# Patient Record
Sex: FEMALE | Race: WHITE | NOT HISPANIC OR LATINO | URBAN - METROPOLITAN AREA
[De-identification: names, ages, dates, MRNs, and addresses within clinical notes are randomized per-mention and may not be internally consistent; named-entity substitution may affect disease eponyms.]

---

## 2023-08-29 ENCOUNTER — OFFICE VISIT (OUTPATIENT)
Dept: URBAN - METROPOLITAN AREA CLINIC 86 | Facility: CLINIC | Age: 58
End: 2023-08-29
Payer: COMMERCIAL

## 2023-08-29 VITALS
BODY MASS INDEX: 20.24 KG/M2 | WEIGHT: 110 LBS | SYSTOLIC BLOOD PRESSURE: 135 MMHG | HEIGHT: 62 IN | DIASTOLIC BLOOD PRESSURE: 79 MMHG | HEART RATE: 58 BPM | TEMPERATURE: 97.3 F

## 2023-08-29 DIAGNOSIS — Z79.899 HIGH RISK MEDICATIONS (NOT ANTICOAGULANTS) LONG-TERM USE: ICD-10-CM

## 2023-08-29 DIAGNOSIS — K76.0 FATTY LIVER: ICD-10-CM

## 2023-08-29 DIAGNOSIS — R74.8 ABNORMAL LIVER ENZYMES: ICD-10-CM

## 2023-08-29 DIAGNOSIS — M19.90 ARTHRITIS: ICD-10-CM

## 2023-08-29 DIAGNOSIS — G43.909 MIGRAINE: ICD-10-CM

## 2023-08-29 DIAGNOSIS — F41.9 ANXIETY AND DEPRESSION: ICD-10-CM

## 2023-08-29 DIAGNOSIS — Z71.89 VACCINE COUNSELING: ICD-10-CM

## 2023-08-29 PROCEDURE — 99205 OFFICE O/P NEW HI 60 MIN: CPT

## 2023-08-29 PROCEDURE — 99245 OFF/OP CONSLTJ NEW/EST HI 55: CPT

## 2023-08-29 RX ORDER — SUMATRIPTAN SUCCINATE 25 MG/1
1 TABLET AT LEAST 2 HOURS BETWEEN DOSES AS NEEDED TABLET, FILM COATED ORAL TWICE A DAY
Status: ACTIVE | COMMUNITY

## 2023-08-29 RX ORDER — ATOGEPANT 30 MG/1
1 TABLET TABLET ORAL ONCE A DAY
Status: ON HOLD | COMMUNITY

## 2023-08-29 NOTE — HPI-TODAY'S VISIT:
Patient is a 57-year-old female being referred in by Tessy LEYVA from the [facility] for evaluation of liver issues.  A copy of the note will be sent to the referring provider.  2 faxes were sent and of information and they were reviewed for the visit.  2023 labs showed serum iodine was low at 32.1 in July and normal at 40.4 in May 30 of this year. She curiously is using sea salt and not supplement iodized salt and she is now on iodine supplement.  Alpha one  level was normal at 113.  Phenotype was not  done.  Pt says father and grandfather and brother  52 of liver disease and hx of alcohol and mother has hx of liver disease alcohol use also.  Pt said she had some social alcohol as youth but not now.  Albumin was 4.3, alk phos 67 AST was 33 ALT 44 elevated with total bili 0.4 direct 0.11 and total protein 6.5.    Hemoglobin 13.1 hematocrit 40.8.  MCV elevated at 103 neutrophils were 54%/2.6.  Platelets 281.  TSH 1.38.  2023 labs showed alk phos 60 AST 38 ALT 38.  Bilirubin 0.4   labs showed AST 29 ALT 23 alk phos 68.  Bilirubin 0.7 cholesterol 179 triglycerides 63 HDL 74 LDL 93 B12 835 vitamin D level 33.1 magnesium 2.2.  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  No livertox re it and in a healthline report it states: In studies of Qulipta, people with increased liver enzymes did not report liver problem symptoms. So your doctor may check your blood levels during your treatment.  No herbals and no green tea. No turmeric and no collagen peptides. No mvi.  No asheagandha or farrukh.  March 3, 2021 labs showed AST 27 ALT 22 alk phos 70.  Bilirubin 0.4.  She says only change was qulipta and the iodine.  Second fax from [facility] sent in on this patient which was 2 pages was an ultrasound from 2023 from [facility] showing the liver to be mildly increased in echotexture, no hepatic masses, main portal vein hepatic veins were patent with appropriate directional flow.  Bile ducts were normal.  Common bile duct was 4 mm.  Gallbladder was normal.  No gallstones.  Pancreas appeared normal.  Pancreatic tail was obscured by gas.  Right kidney 10.2 cm and left kidney 10.2 cm.  Parapelvic cyst seen right kidney measuring 18 x 20 x 9 mm.  No hydronephrosis seen.  Spleen normal size.  No ascites.  The overall felt that the liver had mildly increased echotexture in keeping with fatty replacement but no mass.  Third fax sent and also [facility] at 3 pages mentions that the patient was being referred in by Tessy Mckeon for Abdo liver labs, and the fatty liver finding.  We will try to get the records sent in the missing other medicine information because the fact that the labs are normal before and arising certainly speaks to possible that the medication as a cause.  Generally increasing weight could also do this as well. Plan  1.  She answered the question of other meds and none seen. Off qulipta and that was newest med.. 2.  Needs full screens rule out other liver disease as well as check for hep A/B immunity. Check alpja phenotype. 3.  If it is fatty liver then the focus should be for now  healthy diet and some daily exercise.  4.  Consider fib 4 index.  Duration of the visit was 70 minutes with 40 minutes of chart prep loading the faxes and info to chart and then 30 minutes of face to face visit reviewing recent records, discussing their current status and the future plans for the patient.

## 2023-08-29 NOTE — EXAM-PHYSICAL EXAM
Gen: awake and responsive. Eyes: anicteric, normal lids. Mouth: normal lips. Nose: no drainage Hearing: intact grossly. Neck: trachea midline and no jvd. CV: RRR no s3. Lungs: clear. No wheezes, Abd: Soft, nabs, nr, NT. No hsm. Ext: no sig edema, some palm erythema. Neuro: moves all 4 ext grossly. No asterixis. Skin: no pruritis and some palm erythema.

## 2023-09-08 ENCOUNTER — TELEPHONE ENCOUNTER (OUTPATIENT)
Dept: URBAN - METROPOLITAN AREA CLINIC 86 | Facility: CLINIC | Age: 58
End: 2023-09-08

## 2023-09-08 LAB
A/G RATIO: 1.7
ABSOLUTE BASOPHILS: 72
ABSOLUTE EOSINOPHILS: 254
ABSOLUTE LYMPHOCYTES: 1200
ABSOLUTE MONOCYTES: 456
ABSOLUTE NEUTROPHILS: 2818
ALBUMIN: 4.1
ALKALINE PHOSPHATASE: 51
ALPHA-1-ANTITRYPSIN (AAT) PHENOTYPE: (no result)
ALT (SGPT): 23
ANACHOICE(R) SCREEN: NEGATIVE
AST (SGOT): 28
BASOPHILS: 1.5
BILIRUBIN, TOTAL: 0.5
BUN/CREATININE RATIO: (no result)
BUN: 19
CALCIUM: 9.3
CARBON DIOXIDE, TOTAL: 25
CERULOPLASMIN: 28
CHLORIDE: 106
CREATININE: 0.77
EGFR: 90
EOSINOPHILS: 5.3
FERRITIN, SERUM: 41
GLOBULIN, TOTAL: 2.4
GLUCOSE: 89
HEMATOCRIT: 36.2
HEMOGLOBIN: 12.5
HEPATITIS A AB, TOTAL: (no result)
HEPATITIS B CORE AB TOTAL: (no result)
HEPATITIS B SURFACE AB IMMUNITY, QN: <5
HEPATITIS B SURFACE ANTIGEN: (no result)
HEPATITIS C ANTIBODY: (no result)
IRON BIND.CAP.(TIBC): 301
IRON SATURATION: 37
IRON: 111
LYMPHOCYTES: 25
MCH: 33.3
MCHC: 34.5
MCV: 96.5
MITOCHONDRIAL (M2) ANTIBODY: <=20
MONOCYTES: 9.5
MPV: 9.6
NEUTROPHILS: 58.7
PLATELET COUNT: 264
POTASSIUM: 4.1
PROTEIN, TOTAL: 6.5
RDW: 13
RED BLOOD CELL COUNT: 3.75
SMOOTH MUSCLE AB SCREEN: NEGATIVE
SODIUM: 140
WHITE BLOOD CELL COUNT: 4.8

## 2023-09-08 NOTE — HPI-TODAY'S VISIT:
Dear Raven Keen, August 29 labs show smooth muscle antibody was negative. CHANDA choice screen was negative.  Iron saturation was normal at 37%.  Ferritin normal at 41. Ceruloplasmin normal at 28. You are not immune to hepatitis B. Your AMA was negative at less than 20. Alpha one MM normal. Glucose 89, BUN of 19, creatinine 0.77, sodium 140, potassium 4.1, calcium 9.3, albumin 4.1, bilirubin 0.5, alk phos 51, AST 28 and ALT 23 with ideal ALT less than 25. WBC 4.8 normal, hemoglobin 12.5 normal and platelet count 264. RBC count was a little low at 3.75, and mean corpuscular hemoglobin was slightly up at 33.3.  Please share with primary provider to compare to priors. MCV normal at 96.5. Neutrophils and lymphocytes normal. Hep B core total negative indicating no prior exposure to hepatitis B and the hep B surface antigen also negative. Hepatitis A immunity not seen. Would recommend that you consider getting the combined hepatitis A/B combined vaccine series electively. Hep C antibody negative. As you recall, we did these labs to work you up for the abnormal liver labs and see if a clear cause could be determined by this blood work and that was not to be the case. The laboratories that we saw are clearly better than the laboratories in July when you had an AST of 33 and ALT 44. The only thing that we saw again was that the Qulipta was a new medicine that you have been on and now you are off of that.  Even though its not supposed to cause abnormal labs from a concern for possibility since no other issues were noted. Let see what your repeat labs do and if they remain normal off the Qulipta then that would be very suspicious. Dr. Parikh

## 2023-09-25 ENCOUNTER — LAB OUTSIDE AN ENCOUNTER (OUTPATIENT)
Dept: URBAN - METROPOLITAN AREA CLINIC 86 | Facility: CLINIC | Age: 58
End: 2023-09-25

## 2023-09-27 ENCOUNTER — TELEPHONE ENCOUNTER (OUTPATIENT)
Dept: URBAN - METROPOLITAN AREA CLINIC 86 | Facility: CLINIC | Age: 58
End: 2023-09-27

## 2023-09-27 LAB
ALBUMIN/GLOBULIN RATIO: 1.5
ALBUMIN: 3.9
ALKALINE PHOSPHATASE: 42
ALT (SGPT): 17
AST (SGOT): 18
BILIRUBIN, DIRECT: 0.1
BILIRUBIN, INDIRECT: 0.3
BILIRUBIN, TOTAL: 0.4
GLOBULIN: 2.6
PROTEIN, TOTAL: 6.5

## 2023-09-27 NOTE — HPI-TODAY'S VISIT:
Dear Raven Keen, September 26 labs show albumin normal at 3.9, bilirubin normal at 0.4, direct bilirubin normal at 0.1 and with normal alk phos of 42 AST of 18 and ALT of 17.  Ideal ALT less than 25. Prior August 29 labs showed alk phos 51 AST 28 and ALT 23 so these labs appear to be lower. You mentioned before that you stop taking the Qulipta. Since the labs continue to drop off of it this would suggest that was the likely cause of that bump in the labs. Dr. Parikh

## 2023-10-09 ENCOUNTER — OFFICE VISIT (OUTPATIENT)
Dept: URBAN - METROPOLITAN AREA TELEHEALTH 2 | Facility: TELEHEALTH | Age: 58
End: 2023-10-09
Payer: COMMERCIAL

## 2023-10-09 ENCOUNTER — DASHBOARD ENCOUNTERS (OUTPATIENT)
Age: 58
End: 2023-10-09

## 2023-10-09 VITALS
SYSTOLIC BLOOD PRESSURE: 120 MMHG | DIASTOLIC BLOOD PRESSURE: 69 MMHG | WEIGHT: 109 LBS | BODY MASS INDEX: 20.06 KG/M2 | HEIGHT: 62 IN

## 2023-10-09 DIAGNOSIS — Z79.899 HIGH RISK MEDICATIONS (NOT ANTICOAGULANTS) LONG-TERM USE: ICD-10-CM

## 2023-10-09 DIAGNOSIS — R74.8 ABNORMAL LIVER ENZYMES: ICD-10-CM

## 2023-10-09 DIAGNOSIS — Z71.89 VACCINE COUNSELING: ICD-10-CM

## 2023-10-09 DIAGNOSIS — K76.0 FATTY LIVER: ICD-10-CM

## 2023-10-09 PROCEDURE — 99214 OFFICE O/P EST MOD 30 MIN: CPT

## 2023-10-09 RX ORDER — ATOGEPANT 30 MG/1
1 TABLET TABLET ORAL ONCE A DAY
Status: DISCONTINUED | COMMUNITY

## 2023-10-09 RX ORDER — SUMATRIPTAN SUCCINATE 25 MG/1
1 TABLET AT LEAST 2 HOURS BETWEEN DOSES AS NEEDED TABLET, FILM COATED ORAL TWICE A DAY
Status: ACTIVE | COMMUNITY

## 2023-10-09 NOTE — HPI-TODAY'S VISIT:
Patient is a 57-year-old female seen Aug 2023 and was referred in by Tessy LEYVA from the [facility] for evaluation of liver issues.  A copy of the note will be sent to referring provider.   labs show albumin normal at 3.9, bilirubin normal at 0.4, direct bilirubin normal at 0.1 and with normal alk phos of 42 AST of 18 and ALT of 17.  Ideal ALT less than 25.  Prior  labs showed alk phos 51 AST 28 and ALT 23 so these labs appear to be lower.  You mentioned before that you stop taking the Qulipta. Since the labs continue to drop off of it this would suggest that was the likely cause of that bump in the labs.   labs show smooth muscle antibody was negative. CHANDA choice screen was negative.  Iron saturation was normal at 37%.  Ferritin normal at 41. Ceruloplasmin normal at 28. You are not immune to hepatitis B. Your AMA was negative at less than 20. Alpha one MM normal. Glucose 89, BUN of 19, creatinine 0.77, sodium 140, potassium 4.1, calcium 9.3, albumin 4.1, bilirubin 0.5, alk phos 51, AST 28 and ALT 23 with ideal ALT less than 25. WBC 4.8 normal, hemoglobin 12.5 normal and platelet count 264. RBC count was a little low at 3.75, and mean corpuscular hemoglobin was slightly up at 33.3.  Please share with primary provider to compare to priors. MCV normal at 96.5. Neutrophils and lymphocytes normal. Hep B core total negative indicating no prior exposure to hepatitis B and the hep B surface antigen also negative. Hepatitis A immunity not seen. Would recommend that you consider getting the combined hepatitis A/B combined vaccine series electively. Hep C antibody negative.  As you recall, we did these labs to work you up for the abnormal liver labs and see if a clear cause could be determined by this blood work and that was not to be the case.  The laboratories that we saw are clearly better than the laboratories in July when you had an AST of 33 and ALT 44.  The only thing that we saw again was that the Qulipta was a new medicine that you have been on and now you are off of that.  Even though its not supposed to cause abnormal labs from a concern for possibility since no other issues were noted.  2 faxes were sent and of information and they were reviewed for the visit.  2023 labs showed serum iodine was low at 32.1 in July and normal at 40.4 in May 30 of this year. She curiously is using sea salt and not supplement iodized salt and she is now on iodine supplement.  Alpha one  level was normal at 113.  Phenotype was not  done.  Pt says father and grandfather and brother  52 of liver disease and hx of alcohol and mother has hx of liver disease alcohol use also.  Pt said she had some social alcohol as youth but not now.  Albumin was 4.3, alk phos 67 AST was 33 ALT 44 elevated with total bili 0.4 direct 0.11 and total protein 6.5.    Hemoglobin 13.1 hematocrit 40.8.  MCV elevated at 103 neutrophils were 54%/2.6.  Platelets 281.  TSH 1.38.  2023 labs showed alk phos 60 AST 38 ALT 38.  Bilirubin 0.4   labs showed AST 29 ALT 23 alk phos 68.  Bilirubin 0.7 cholesterol 179 triglycerides 63 HDL 74 LDL 93 B12 835 vitamin D level 33.1 magnesium 2.2.  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  No livertox re it and in a healthline report it states: In studies of Qulipta, people with increased liver enzymes did not report liver problem symptoms. So your doctor may check your blood levels during your treatment.  No herbals and no green tea. No turmeric and no collagen peptides. No mvi.  No ashwagandha or farrukh.  March 3, 2021 labs showed AST 27 ALT 22 alk phos 70.  Bilirubin 0.4.  She says only change was qulipta and the iodine.  Second fax from [facility] sent in on this patient which was 2 pages was an ultrasound from 2023 from [facility] showing the liver to be mildly increased in echotexture, no hepatic masses, main portal vein hepatic veins were patent with appropriate directional flow.  Bile ducts were normal.  Common bile duct was 4 mm.  Gallbladder was normal.  No gallstones.  Pancreas appeared normal.  Pancreatic tail was obscured by gas.  Right kidney 10.2 cm and left kidney 10.2 cm.  Parapelvic cyst seen right kidney measuring 18 x 20 x 9 mm.  No hydronephrosis seen.  Spleen normal size.  No ascites.  The overall felt that the liver had mildly increased echotexture in keeping with fatty replacement but no mass.  Third fax sent and also [facility] at 3 pages mentions that the patient was being referred in by Tessy Mckeon for Abdo liver labs, and the fatty liver finding.  We will try to get the records sent in the missing other medicine information because the fact that the labs are normal before and arising certainly speaks to possible that the medication as a cause.  Generally increasing weight could also do this as well. Plan  1.  Qulipta to be cause. Better off it almost to normal baseline labs. 2. Need to Tessy know re this as other screens. 3. She was on it for migraines and need to see how do on any meds. 4. If not new meds do labs in 3m. 5, If new meds started do labs in 1m and 3m or sooner pending the course. 6. Needs to consider hep a and b vaccine series.  Duration of the visit was 31 minutes with 10 minutes of chart prep loading the faxes and info to chart and then 21 minutes of telemed visit reviewing recent records, discussing their current status and the future plans for the patient.

## 2023-10-09 NOTE — EXAM-PHYSICAL EXAM
Gen: no distress. Eyes: no jaundice. Mouth: no thrush. CV: no chest pain. Resp: no wheezes. Abd: no pain. Ext: no edema.  Neuro: no weakness.

## 2023-11-06 ENCOUNTER — LAB OUTSIDE AN ENCOUNTER (OUTPATIENT)
Dept: URBAN - METROPOLITAN AREA TELEHEALTH 2 | Facility: TELEHEALTH | Age: 58
End: 2023-11-06

## 2024-01-09 ENCOUNTER — LAB OUTSIDE AN ENCOUNTER (OUTPATIENT)
Dept: URBAN - METROPOLITAN AREA TELEHEALTH 2 | Facility: TELEHEALTH | Age: 59
End: 2024-01-09

## 2024-01-18 ENCOUNTER — OFFICE VISIT (OUTPATIENT)
Dept: URBAN - METROPOLITAN AREA TELEHEALTH 2 | Facility: TELEHEALTH | Age: 59
End: 2024-01-18

## 2024-01-18 RX ORDER — SUMATRIPTAN SUCCINATE 25 MG/1
1 TABLET AT LEAST 2 HOURS BETWEEN DOSES AS NEEDED TABLET, FILM COATED ORAL TWICE A DAY
Status: ACTIVE | COMMUNITY

## 2024-01-18 NOTE — HPI-TODAY'S VISIT:
Patient is a 57-year-old female seen Oct 2023 and was referred in by Tessy LEYVA from the [facility] for evaluation of liver issues.  A copy of the note will be sent to referring provider.   labs show albumin normal at 3.9, bilirubin normal at 0.4, direct bilirubin normal at 0.1 and with normal alk phos of 42 AST of 18 and ALT of 17.  Ideal ALT less than 25.  Prior  labs showed alk phos 51 AST 28 and ALT 23 so these labs appear to be lower.  You mentioned before that you stop taking the Qulipta. Since the labs continue to drop off of it this would suggest that was the likely cause of that bump in the labs.   labs show smooth muscle antibody was negative. CHANDA choice screen was negative.  Iron saturation was normal at 37%.  Ferritin normal at 41. Ceruloplasmin normal at 28. You are not immune to hepatitis B. Your AMA was negative at less than 20. Alpha one MM normal. Glucose 89, BUN of 19, creatinine 0.77, sodium 140, potassium 4.1, calcium 9.3, albumin 4.1, bilirubin 0.5, alk phos 51, AST 28 and ALT 23 with ideal ALT less than 25. WBC 4.8 normal, hemoglobin 12.5 normal and platelet count 264. RBC count was a little low at 3.75, and mean corpuscular hemoglobin was slightly up at 33.3.  Please share with primary provider to compare to priors. MCV normal at 96.5. Neutrophils and lymphocytes normal. Hep B core total negative indicating no prior exposure to hepatitis B and the hep B surface antigen also negative. Hepatitis A immunity not seen. Would recommend that you consider getting the combined hepatitis A/B combined vaccine series electively. Hep C antibody negative.  As you recall, we did these labs to work you up for the abnormal liver labs and see if a clear cause could be determined by this blood work and that was not to be the case.  The laboratories that we saw are clearly better than the laboratories in July when you had an AST of 33 and ALT 44.  The only thing that we saw again was that the Qulipta was a new medicine that you have been on and now you are off of that.  Even though its not supposed to cause abnormal labs from a concern for possibility since no other issues were noted.  2 faxes were sent and of information and they were reviewed for the visit.  2023 labs showed serum iodine was low at 32.1 in July and normal at 40.4 in May 30 of this year. She curiously is using sea salt and not supplement iodized salt and she is now on iodine supplement.  Alpha one  level was normal at 113.  Phenotype was not  done.  Pt says father and grandfather and brother  52 of liver disease and hx of alcohol and mother has hx of liver disease alcohol use also.  Pt said she had some social alcohol as youth but not now.  Albumin was 4.3, alk phos 67 AST was 33 ALT 44 elevated with total bili 0.4 direct 0.11 and total protein 6.5.    Hemoglobin 13.1 hematocrit 40.8.  MCV elevated at 103 neutrophils were 54%/2.6.  Platelets 281.  TSH 1.38.  2023 labs showed alk phos 60 AST 38 ALT 38.  Bilirubin 0.4   labs showed AST 29 ALT 23 alk phos 68.  Bilirubin 0.7 cholesterol 179 triglycerides 63 HDL 74 LDL 93 B12 835 vitamin D level 33.1 magnesium 2.2.  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  No livertox re it and in a healthline report it states: In studies of Qulipta, people with increased liver enzymes did not report liver problem symptoms. So your doctor may check your blood levels during your treatment.  No herbals and no green tea. No turmeric and no collagen peptides. No mvi.  No ashwagandha or farrukh.  March 3, 2021 labs showed AST 27 ALT 22 alk phos 70.  Bilirubin 0.4.  She says only change was qulipta and the iodine.  Second fax from [facility] sent in on this patient which was 2 pages was an ultrasound from 2023 from [facility] showing the liver to be mildly increased in echotexture, no hepatic masses, main portal vein hepatic veins were patent with appropriate directional flow.  Bile ducts were normal.  Common bile duct was 4 mm.  Gallbladder was normal.  No gallstones.  Pancreas appeared normal.  Pancreatic tail was obscured by gas.  Right kidney 10.2 cm and left kidney 10.2 cm.  Parapelvic cyst seen right kidney measuring 18 x 20 x 9 mm.  No hydronephrosis seen.  Spleen normal size.  No ascites.  The overall felt that the liver had mildly increased echotexture in keeping with fatty replacement but no mass.  Third fax sent and also [facility] at 3 pages mentions that the patient was being referred in by Tessy Mckeon for Abdo liver labs, and the fatty liver finding.  We will try to get the records sent in the missing other medicine information because the fact that the labs are normal before and arising certainly speaks to possible that the medication as a cause.  Generally increasing weight could also do this as well. Plan  1.  Qulipta to be cause. Better off it almost to normal baseline labs. 2. Need to Tessy know re this as other screens. 3. She was on it for migraines and need to see how do on any meds. 4. If not new meds do labs in 3m. 5, If new meds started do labs in 1m and 3m or sooner pending the course. 6. Needs to consider hep a and b vaccine series.  Duration of the visit wasm minutes with 10 minutes of chart prep loading the faxes and info to chart and then 21 minutes of telemed visit reviewing recent records, discussing their current status and the future plans for the patient.